# Patient Record
Sex: FEMALE
[De-identification: names, ages, dates, MRNs, and addresses within clinical notes are randomized per-mention and may not be internally consistent; named-entity substitution may affect disease eponyms.]

---

## 2021-04-12 ENCOUNTER — RX ONLY (RX ONLY)
Age: 61
End: 2021-04-12

## 2021-04-12 ENCOUNTER — MOHS SURGERY-ROUTINE (OUTPATIENT)
Dept: URBAN - METROPOLITAN AREA CLINIC 12 | Facility: CLINIC | Age: 61
Setting detail: DERMATOLOGY
End: 2021-04-12

## 2021-04-12 DIAGNOSIS — L82.1 OTHER SEBORRHEIC KERATOSIS: ICD-10-CM

## 2021-04-12 DIAGNOSIS — D22.5 MELANOCYTIC NEVI OF TRUNK: ICD-10-CM

## 2021-04-12 DIAGNOSIS — Z85.820 PERSONAL HISTORY OF MALIGNANT MELANOMA OF SKIN: ICD-10-CM

## 2021-04-12 DIAGNOSIS — Z85.828 PERSONAL HISTORY OF OTHER MALIGNANT NEOPLASM OF SKIN: ICD-10-CM

## 2021-04-12 PROCEDURE — 17311 MOHS 1 STAGE H/N/HF/G: CPT

## 2021-04-12 PROCEDURE — 14060 TIS TRNFR E/N/E/L 10 SQ CM/<: CPT

## 2021-04-12 RX ORDER — FLUCONAZOLE 150 MG/1
1 TABLET TABLET ORAL AS DIRECTED
Qty: 1 | Refills: 3
Start: 2021-04-12

## 2021-04-12 RX ORDER — SULFAMETHOXAZOLE AND TRIMETHOPRIM 800; 160 MG/1; MG/1
1 TABLET TABLET ORAL TWICE A DAY
Qty: 10 | Refills: 0
Start: 2021-04-12

## 2021-04-19 ENCOUNTER — MOHS SURGERY-ROUTINE (OUTPATIENT)
Dept: URBAN - METROPOLITAN AREA CLINIC 12 | Facility: CLINIC | Age: 61
Setting detail: DERMATOLOGY
End: 2021-04-19

## 2021-04-19 DIAGNOSIS — L82.1 OTHER SEBORRHEIC KERATOSIS: ICD-10-CM

## 2021-04-19 DIAGNOSIS — D18.01 HEMANGIOMA OF SKIN AND SUBCUTANEOUS TISSUE: ICD-10-CM

## 2021-04-19 DIAGNOSIS — Z85.828 PERSONAL HISTORY OF OTHER MALIGNANT NEOPLASM OF SKIN: ICD-10-CM

## 2021-04-19 PROCEDURE — 17313 MOHS 1 STAGE T/A/L: CPT

## 2021-04-19 PROCEDURE — 17311 MOHS 1 STAGE H/N/HF/G: CPT

## 2021-04-19 PROCEDURE — 13132 CMPLX RPR F/C/C/M/N/AX/G/H/F: CPT

## 2024-04-08 ENCOUNTER — OFFICE VISIT (OUTPATIENT)
Dept: ENT CLINIC | Age: 64
End: 2024-04-08
Payer: OTHER GOVERNMENT

## 2024-04-08 ENCOUNTER — OFFICE VISIT (OUTPATIENT)
Dept: AUDIOLOGY | Age: 64
End: 2024-04-08
Payer: OTHER GOVERNMENT

## 2024-04-08 VITALS — RESPIRATION RATE: 18 BRPM | WEIGHT: 124 LBS | HEIGHT: 64 IN | BODY MASS INDEX: 21.17 KG/M2

## 2024-04-08 DIAGNOSIS — H90.41 SENSORINEURAL HEARING LOSS (SNHL) OF RIGHT EAR WITH UNRESTRICTED HEARING OF LEFT EAR: Primary | ICD-10-CM

## 2024-04-08 DIAGNOSIS — D33.3 VESTIBULAR SCHWANNOMA (HCC): Primary | ICD-10-CM

## 2024-04-08 PROCEDURE — 92557 COMPREHENSIVE HEARING TEST: CPT | Performed by: AUDIOLOGIST

## 2024-04-08 PROCEDURE — 92567 TYMPANOMETRY: CPT | Performed by: AUDIOLOGIST

## 2024-04-08 PROCEDURE — 99204 OFFICE O/P NEW MOD 45 MIN: CPT | Performed by: OTOLARYNGOLOGY

## 2024-04-08 RX ORDER — ALPRAZOLAM 0.5 MG/1
0.5 TABLET ORAL DAILY PRN
COMMUNITY
Start: 2023-06-29

## 2024-04-08 ASSESSMENT — ENCOUNTER SYMPTOMS
ABDOMINAL PAIN: 0
SORE THROAT: 0
SHORTNESS OF BREATH: 0

## 2024-04-08 NOTE — PROGRESS NOTES
AUDIOLOGY EVALUATION    Serena Gaitan had Tympanometry and Audiometry performed today.    The patient reports hearing loss.     Results as follows:    Tympanometry    Type A -  bilaterally    Audiometry    Test Performed - Comprehensive Audiogram    Type of Loss - Right Ear: abnormal hearing: degree of loss is normal to moderately severe sensorineural hearing loss                           Left Ear: normal hearing    SRT   Measurement Right Ear Left Ear   Value 40 5   Unit dB dB     Discrimination  Measurement Right Ear Left Ear   Value 84% 100%   Unit dB dB     Recommend  Further testing due to asymmetry and amplification following medical clearance    Nima Lockett [facility]  Audiologist

## 2024-04-08 NOTE — PROGRESS NOTES
Chief Complaint   Patient presents with    Hearing Problem     Patient states that she is here for a hearing test and states that since her radiation six month ago is when her hearing issue started.        HPI:  Serena Gaitan is a 64 y.o. female seen today in initial consultation for a right vestibular schwannoma.  This was diagnosed back in July and she was treated with 5 courses of radiation at [facility] over the summer.  She had seen Kong Fishman initially with Neurosurgery.  She had a follow-up MRI scan back in December.  She has never seen ENT and has not yet had an audiogram.  She has noted decreased hearing on the right side since she finished her treatment.  She has been dealing with some dizziness as well, although there is no room spinning vertigo.  She works herself as a physical therapist.  She denies any otalgia or otorrhea.    Past Medical History, Past Surgical History, Family history, Social History, and Medications were all reviewed with the patient today and updated as necessary.     Allergies   Allergen Reactions    Metronidazole Other (See Comments)     Other Reaction(s): Other- (not listed) - Allergy     There is no problem list on file for this patient.    Current Outpatient Medications   Medication Sig    Zinc Acetate (GALZIN) 50 MG capsule Take by mouth    Ibuprofen-diphenhydrAMINE Cit 200-38 MG TABS Take 1 tablet by mouth as needed    vitamin D (CHOLECALCIFEROL) 50 MCG (2000 UT) CAPS capsule Take by mouth    ALPRAZolam (XANAX) 0.5 MG tablet Take 1 tablet by mouth daily as needed.     No current facility-administered medications for this visit.     History reviewed. No pertinent past medical history.  Social History     Tobacco Use    Smoking status: Not on file    Smokeless tobacco: Not on file   Substance Use Topics    Alcohol use: Not on file     No past surgical history on file.  No family history on file.     ROS:    Review of Systems   Constitutional:  Negative for fever.   HENT:  Positive

## 2024-07-17 ENCOUNTER — TELEPHONE (OUTPATIENT)
Dept: ENT CLINIC | Age: 64
End: 2024-07-17

## 2024-07-17 DIAGNOSIS — D33.3 VESTIBULAR SCHWANNOMA (HCC): Primary | ICD-10-CM

## 2024-07-17 NOTE — TELEPHONE ENCOUNTER
I see in not needs MRI IAC in Sept. Order pending and I will call her to let her know where she can have it done.